# Patient Record
Sex: FEMALE | Race: WHITE | Employment: STUDENT | URBAN - METROPOLITAN AREA
[De-identification: names, ages, dates, MRNs, and addresses within clinical notes are randomized per-mention and may not be internally consistent; named-entity substitution may affect disease eponyms.]

---
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
## 2025-03-03 ENCOUNTER — OFFICE VISIT (OUTPATIENT)
Age: 20
End: 2025-03-03
Payer: MEDICAID

## 2025-03-03 ENCOUNTER — HOSPITAL ENCOUNTER (OUTPATIENT)
Dept: GENERAL RADIOLOGY | Facility: HOSPITAL | Age: 20
Discharge: HOME | End: 2025-03-03
Attending: PHYSICIAN ASSISTANT
Payer: MEDICAID

## 2025-03-03 DIAGNOSIS — M54.2 CERVICALGIA: ICD-10-CM

## 2025-03-03 DIAGNOSIS — M54.2 NECK PAIN: ICD-10-CM

## 2025-03-03 DIAGNOSIS — M54.2 NECK PAIN: Primary | ICD-10-CM

## 2025-03-03 DIAGNOSIS — M54.12 CERVICAL RADICULOPATHY: ICD-10-CM

## 2025-03-03 PROCEDURE — 72050 X-RAY EXAM NECK SPINE 4/5VWS: CPT | Performed by: PHYSICIAN ASSISTANT

## 2025-03-03 RX ORDER — METHOCARBAMOL 500 MG/1
500 TABLET, FILM COATED ORAL NIGHTLY PRN
Qty: 30 TABLET | Refills: 1 | Status: SHIPPED | OUTPATIENT
Start: 2025-03-03

## 2025-03-03 NOTE — PROGRESS NOTES
Patient: Suha Valles  Medical Record Number: [id number]  Site: [facility]  Referring Physician:  No ref. provider found  PCP: Moses Sinclair MD       HISTORY OF CHIEF COMPLAINT:    Suha Valles is a 19 year old female, who complains of 1 year h/o bilateral radiating neck pain.  Denies dropping objects or difficulty walking.  Most the patient's pain is right to the right and left side of her neck to her trapezius.  She occasionally gets pain down her bilateral upper arms.  Right side is worse than left side.  She occasionally has tingling in the same distribution as her pain.  She has no weakness.  She is not dropping objects.  She feels a heaviness upon her shoulders.  She has not had any recent treatment other than heat and anti-inflammatories.  She had a neck strain that was treated with muscle relaxers in 2021.    VAS Pain Score: 2 /10      History reviewed. No pertinent past medical history.   Past Surgical History:   Procedure Laterality Date    Tonsillectomy        Family History   Problem Relation Age of Onset    Other (Other) Paternal Grandfather         LUPUS      Social History     Socioeconomic History    Marital status: Single   Tobacco Use    Smoking status: Never    Smokeless tobacco: Never   Vaping Use    Vaping status: Never Used   Substance and Sexual Activity    Alcohol use: Never    Drug use: Never      Current Medications:  Current Outpatient Medications   Medication Sig Dispense Refill    methocarbamol 500 MG Oral Tab Take 1 tablet (500 mg total) by mouth nightly as needed. 30 tablet 1    fludrocortisone 0.1 MG Oral Tab         .        IMAGING STUDIES:  X-rays were reviewed with the patient today  X-rays  Images were reviewed and discussed with the patient.  They voiced understanding of what the images showed.  EXAM:  X-RAY OF CERVICAL SPINE     CLINICAL INFORMATION:  Neck pain    FINDINGS:    AP and Lateral views of cervical spine completed.   7 cervical vertebrae visualized.   Disc  heights well maintained.    Spinous processes well aligned.      No obvious fractures or destructive lesions seen.        IMPRESSION:    Unremarkable AP/Lateral views of cervical spine.          PHYSICAL EXAMIMATION   PHYSICAL EXAMINATION: Suha Valles is a 19 year old   female who is observed sitting comfortably in the exam room alert and oriented times three. RESPIRATORY RATE: 18 She looks consistent her stated age.    LMP 07/15/2024 (Approximate)   The patient is well developed, well nourished, thin body habitus, well muscled.       Inspection: No acute distress.   Patient displays non-antalgic gait, and is able to normal heel walk, normal toe walk.     Coordination: Well coordinated, Fluid gait    Cervical spine:  Patient is A&O X 3 and in no apparent distress.   Neck is soft and supple with no masses or lymphadenopathy palpated.  mildly tender to palpation over the para spinals and C7 spinous process  Skin intact with no erythema, edema or ecchymosis noted  pain with left and right rotation of neck  Spulrlings Maneuver is negative  Davis's Maneuver is negative bilaterally  Smooth pain free ROM to bilateral wrist, elbow; no shoulder pain  Sensation is intact to all dermatome patterns of bilateral upper extremities  Tinel's is negative to bilateral cubital and carpal tunnels. negative Phalen's    Strength testing:   Left   Right   5/5 Deltoid  5/5 Deltoid    5/5 Biceps  5/5 Biceps   5/5 Triceps  5/5 Triceps    5/5 Ext Rot  5/5 Ext Rot   5/5 Wrist Ext  5/5 Wrist Ext   5/5 Intrinsics  5/5 Intrinsics    DTR  Left   Right   2+Biceps  2+ Biceps   2+Triceps  2+Triceps   2+Brichoradialis  2+Brachioradialis    Bilateral Lower Extremitis are negative for clonus                                                                                       MEDICAL DECISION MAKING:   Impression: Cervical radiculopathy  Cervicalgia    Plan: We discussed the diagnosis and treatment options today, including rationale for surgical vs  non-surgical options.  The patient mainly has isolated neck pain to the trapezius.  She occasionally describes symptoms of radiculopathy.  We are going to have her start with physical therapy.  I gave her the names of physical therapist in [place] that are good for the cervical spine.  We reviewed her x-rays today.  I gave her Robaxin just to use at nighttime as needed for muscle spasms.  Side effects were reviewed and discussed with her.  We will see her back in 6 weeks.  Her concerns were addressed.  Restrictions and limitations were reviewed with the patient.  Concerns were addressed.  Questions were encouraged and answered.  Patient voiced understanding.  Images were reviewed and discussed with the patient.  They voiced understanding of what the images showed.      The patient indicates understanding of these issues and agrees to the plan.    Thank you very much.     Respectfully yours,    Sushil Fulton PA-C    This note was dictated using Dragon software. While it was briefly proofread prior to completion, some grammatical, spelling, and word choice errors due to dictation may still occur.

## 2025-03-05 ENCOUNTER — APPOINTMENT (OUTPATIENT)
Dept: PHYSICAL THERAPY | Age: 20
End: 2025-03-05
Attending: PHYSICIAN ASSISTANT
Payer: MEDICAID

## 2025-03-05 ENCOUNTER — OFFICE VISIT (OUTPATIENT)
Dept: PHYSICAL THERAPY | Age: 20
End: 2025-03-05
Attending: PHYSICIAN ASSISTANT
Payer: MEDICAID

## 2025-03-05 PROCEDURE — 97110 THERAPEUTIC EXERCISES: CPT | Performed by: PHYSICAL THERAPIST

## 2025-03-05 PROCEDURE — 97162 PT EVAL MOD COMPLEX 30 MIN: CPT | Performed by: PHYSICAL THERAPIST

## 2025-03-05 NOTE — PROGRESS NOTES
SPINE EVALUATION:     Diagnosis:   Cervical radiculopathy (M54.12)  Cervicalgia (M54.2) Patient:  Suha Valles        Referring Provider: Sushil Fulton  Today's Date   3/5/2025    Precautions:  None   Date of Evaluation: 03/05/25  Next MD visit: April 14  Date of Surgery: NA     PATIENT SUMMARY   Summary of chief complaints: pain looking up during the day. Rotating and looking down is good. Reports if stays looking down too long will get pain into arms mostly L.  History of current condition: States started around a year ago - started to notice pain at her neck and upper back.  States some pain into R upper arm to elbow , min to L upper arm. 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   Pain level: current 2 /10, at best 2 /10, at worst 8 /10  Description of symptoms: pain at the neck during day, upper back starts more at night.  Arm -if doing dishes, lifting, dressing.  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  Did stop due to the pain, not working at this time.   Leisure activities/Hobbies:     Prior level of function: Prior to starting work as CNA a year ago had no pain  Current limitations: reaching objects off shelf, looking up to get things, doing dishes- states arms get tired and will get some pain into L arm mostly.  States difficulty falling asleep due to pain.   Pt goals: Reduce pain and heaviness at neck  Past medical history was reviewed with Suha.  Significant findings include:  Patient reports she has POTS.  May get dizzy at times or flushed.   Imaging/Tests: x-rays - normal   Suha  has no past medical history on file.  She  has a past surgical history that includes tonsillectomy.    ASSESSMENT  Suha presents to physical therapy evaluation with primary c/o pain looking up during the day. Rotating and looking down is good.. The results of the objective tests and measures show poor  sitting/standing posture with frwd head/shoulders, cervical spine restriction of motion with tightness L > R scalenes, Upper trap, lev scap, impaired scapular strength bilaterally, signs and symptoms consistent with postural syndrome and thoracic outlet,  median and ulnar nerve restriction L to R UE and c/o pain. Functional deficits include but are not limited to reaching objects off shelf, looking up to get things, doing dishes- states. Signs and symptoms are consistent with diagnosis of Cervical radiculopathy (M54.12)  Cervicalgia (M54.2). Pt and PT discussed evaluation findings, pathology, POC and HEP.  Pt voiced understanding and performs HEP correctly without reported pain. Skilled Physical Therapy is medically necessary to address the above impairments and reach functional goals. Focus will be on postural correction, scapular strengthening/endurance, pec stretches, cervical mobility/stretches, nerve mobilization.    OBJECTIVE:    Musculoskeletal:  Observation/Posture: forward head posture; rounded shoulders; increased thoracic kyphosis      Palpation:  tenderness L > R UT area, tenderness L > R cervical paraspinals.     Special tests:   Posture correction- L UT discomfort -   Cervical motions - did not produce tingling into arms, produced, NW neck stiffness     ROM and Strength:  (* denotes performed with pain)     Cervical ROM MMT (-/5)     Flex 60       Ext 50 - pain at the neck      R L R L     Side bend 35- neck pain L 32, neck pain L         Rotation 60- pulling L 60 - pulling L > R       ,   Shoulder   ROM MMT (-/5)    R L R L     Flex 160 160 5/5 5/5     Ext 65 50 5/5 5/5     Abd (C5) 165 165 5/5 5/5     IR WNL WNL 5/5 5/5     ER WNL WNL 5/5 5/5     Low Trap n/a 3+ 4minus/5     Mid Trap n/a 4 minus 4 minus     SA n/a           Flexibility:  UE Flexibility R L     Upper Trap min restricted mod restricted     Levator Scap min restricted mod restricted     Pec Major mod restricted mod restricted      Cris min restricted mod restricted     Latissimus WNL WNL     Bicep           Neurological:  Sensation: grossly intact to light touch CAMILLE UE/LE               Today's Treatment and Response:   Pt education was provided on exam findings, treatment diagnosis, treatment plan, expectations, and prognosis.  Today's Treatment       3/5/2025   Spine Treatment   Therapeutic Exercise Minutes 10   Evaluation Minutes 40   Total Time Of Timed Procedures 10   Total Time Of Service-Based Procedures 40   Total Treatment Time 50   HEP Corrected posture with lumbar support  Seated scapular retractions  Seated cervical retractions        Patient was instructed in and issued a HEP for: Corrected posture with lumbar support  Seated scapular retractions  Seated cervical retractions    Charges:  PT EVAL: Moderate Complexity,    In agreement with evaluation findings and clinical rationale, this evaluation involved MODERATE COMPLEXITY decision making due to 1-2 personal factors/comorbidities, 3 or more body structures involved/activity limitations, and evolving symptoms as documented in the evaluation.                                                                         PLAN OF CARE:    Goals: (to be met in 12 visits)   1. Patient to be independent with ongoing HEP,   2. Patient to have improved scapular strength by 1/2 to 1 mm grade for improved posture and support of arm with motions   3. Patient to report no tingling into arms with daily activities.   4. Patient to be able to look up and reach into closet or overhead without difficulty,   5. Patient to be aware of posture when sitting without cues,   6. Patient to be able to carry moderate weight without pain.   7. Patient to report no problems falling asleep at night due to pain.      Frequency / Duration: Patient will be seen 1-2 x weekx/week or a total of 12  visits over a 90 day period. Treatment will include: Manual Therapy; Therapeutic Exercise; Home Exercise Program  instruction    Education or treatment limitation: None   Rehab Potential: good     Neck Disability Index Score  Score: (Patient-Rptd) 22 % (3/4/2025  6:24 PM)      Patient was advised of these findings, precautions, and treatment options and has agreed to actively participate in planning and for this course of care.    Thank you for your referral. Please co-sign or sign and return this letter via fax as soon as possible to [phone]. If you have any questions, please contact me at Dept: [phone]    Sincerely,  Electronically signed by therapist: Lliia Light PT  Physician's certification required: Yes  I certify the need for these services furnished under this plan of treatment and while under my care.    X___________________________________________________ Date____________________    Certification From: 3/5/2025  To:6/3/2025

## 2025-03-07 ENCOUNTER — APPOINTMENT (OUTPATIENT)
Dept: PHYSICAL THERAPY | Age: 20
End: 2025-03-07
Attending: PHYSICIAN ASSISTANT
Payer: MEDICAID

## 2025-03-07 ENCOUNTER — TELEPHONE (OUTPATIENT)
Dept: PHYSICAL THERAPY | Facility: HOSPITAL | Age: 20
End: 2025-03-07

## 2025-03-24 ENCOUNTER — APPOINTMENT (OUTPATIENT)
Dept: PHYSICAL THERAPY | Age: 20
End: 2025-03-24
Attending: PHYSICIAN ASSISTANT
Payer: MEDICAID

## 2025-03-25 ENCOUNTER — TELEPHONE (OUTPATIENT)
Dept: PHYSICAL THERAPY | Age: 20
End: 2025-03-25

## 2025-03-28 ENCOUNTER — APPOINTMENT (OUTPATIENT)
Dept: PHYSICAL THERAPY | Age: 20
End: 2025-03-28
Attending: PHYSICIAN ASSISTANT
Payer: MEDICAID

## 2025-04-01 ENCOUNTER — APPOINTMENT (OUTPATIENT)
Dept: PHYSICAL THERAPY | Age: 20
End: 2025-04-01
Attending: PHYSICIAN ASSISTANT
Payer: MEDICAID

## 2025-04-04 ENCOUNTER — APPOINTMENT (OUTPATIENT)
Dept: PHYSICAL THERAPY | Age: 20
End: 2025-04-04
Attending: PHYSICIAN ASSISTANT
Payer: MEDICAID

## 2025-04-07 ENCOUNTER — APPOINTMENT (OUTPATIENT)
Dept: PHYSICAL THERAPY | Age: 20
End: 2025-04-07
Attending: PHYSICIAN ASSISTANT
Payer: MEDICAID

## 2025-04-09 ENCOUNTER — APPOINTMENT (OUTPATIENT)
Dept: PHYSICAL THERAPY | Age: 20
End: 2025-04-09
Attending: PHYSICIAN ASSISTANT
Payer: MEDICAID

## (undated) NOTE — ED AVS SNAPSHOT
Brian Burt   MRN: [id number]    Department:  [facility] Emergency Department   Date of Visit:  5/24/2019           Disclosure     Insurance plans vary and the physician(s) referred by the ER may not be covered by your plan.  Please contact you CARE PHYSICIAN AT ONCE OR RETURN IMMEDIATELY TO THE EMERGENCY DEPARTMENT. If you have been prescribed any medication(s), please fill your prescription right away and begin taking the medication(s) as directed.   If you believe that any of the medications

## (undated) NOTE — ED AVS SNAPSHOT
Parent/Legal Guardian Access to the Online [employer] Record of a Patient 15to 16Years Old  Return completed form by Secure email to [place] HIM/Medical Records Department: [email or web address]     Requirements and Procedures   Under [facility] MyChart ID and password with another person, that person may be able to view my or my child’s health information, and health information about someone who has authorized me as a MyChart proxy.    ·  I agree that it is my responsibility to select a confident Sign-Up Form and I agree to its terms.        Authorization Form     Please enter Patient’s information below:   Name (last, first, middle initial) __________________________________________   Gender  Male  Female    Last 4 Digits of Social Security Number Parent/Legal Guardian Signature                                  For Patient (1517 years of age)  I agree to allow my parent/legal guardian, named above, online access to my medical information currently available and that may become available as a result

## (undated) NOTE — LETTER
Date & Time: 10/4/2022, 2:50 PM  Patient: Humberto Perez  Encounter Provider(s):    Aron Closs, APRN       To Whom It May Concern:    Humberto Perez was seen and treated in our department on 10/4/2022. She can return to school.     If you have any questions or concerns, please do not hesitate to call.        _____________________________  Physician/APC Signature

## (undated) NOTE — LETTER
Date & Time: 3/11/2024, 3:38 PM  Patient: Suha Valles  Encounter Provider(s):    Estrella Mack MD       To Whom It May Concern:    Suha Valles was seen and treated in our department on 3/11/2024. She should not return to work until 03/13/2024 .    If you have any questions or concerns, please do not hesitate to call.        _____________________________  Physician/APC Signature

## (undated) NOTE — LETTER
Date & Time: 3/15/2021, 9:11 AM  Patient: Brian Burt  Encounter Provider(s):    Sammi Park MD       To Whom It May Concern:    Brian Burt was seen and treated in our department on 3/15/2021. She should not return to school until 03/17/2021.

## (undated) NOTE — LETTER
Date & Time: 6/14/2024, 9:26 AM  Patient: Suha Valles  Encounter Provider(s):    Riana Roberts MD       To Whom It May Concern:    Suha Valles was seen and treated in our department on 6/14/2024.     If you have any questions or concerns, please do not hesitate to call.        _____________________________  Physician/APC Signature

## (undated) NOTE — ED AVS SNAPSHOT
Senora Denver   MRN: [id number]    Department:  [facility] Emergency Department   Date of Visit:  12/10/2017           Disclosure     Insurance plans vary and the physician(s) referred by the ER may not be covered by your plan.  Please contact yo CARE PHYSICIAN AT ONCE OR RETURN IMMEDIATELY TO THE EMERGENCY DEPARTMENT. If you have been prescribed any medication(s), please fill your prescription right away and begin taking the medication(s) as directed.   If you believe that any of the medications

## (undated) NOTE — ED AVS SNAPSHOT
Parent/Legal Guardian Access to the Online [employer] Record of a Patient 15to 16Years Old  Return completed form by Secure email to [place] HIM/Medical Records Department: [email or web address]     Requirements and Procedures   Under [facility] MyChart ID and password with another person, that person may be able to view my or my child’s health information, and health information about someone who has authorized me as a MyChart proxy.    ·  I agree that it is my responsibility to select a confident Sign-Up Form and I agree to its terms.        Authorization Form     Please enter Patient’s information below:   Name (last, first, middle initial) __________________________________________   Gender  Male  Female    Last 4 Digits of Social Security Number Parent/Legal Guardian Signature                                  For Patient (1517 years of age)  I agree to allow my parent/legal guardian, named above, online access to my medical information currently available and that may become available as a result

## (undated) NOTE — LETTER
Date & Time: 11/14/2023, 4:49 AM  Patient: Marca Schaumann  Encounter Provider(s):    Hayley Ag MD       To Whom It May Concern:    Marca Schaumann was seen and treated in our department on 11/14/2023. She can return to work 11/15/2023.     If you have any questions or concerns, please do not hesitate to call.        _____________________________  RN Signature

## (undated) NOTE — ED AVS SNAPSHOT
Parent/Legal Guardian Access to the Online [employer] Record of a Patient 15to 16Years Old  Return completed form by Secure email to [place] HIM/Medical Records Department: [email or web address]     Requirements and Procedures   Under [facility] MyChart ID and password with another person, that person may be able to view my or my child’s health information, and health information about someone who has authorized me as a MyChart proxy.    ·  I agree that it is my responsibility to select a confident Sign-Up Form and I agree to its terms.        Authorization Form     Please enter Patient’s information below:   Name (last, first, middle initial) __________________________________________   Gender  Male  Female    Last 4 Digits of Social Security Number Parent/Legal Guardian Signature                                  For Patient (1517 years of age)  I agree to allow my parent/legal guardian, named above, online access to my medical information currently available and that may become available as a result

## (undated) NOTE — LETTER
Date & Time: 10/18/2022, 10:12 AM  Patient: Niya Gaming  Encounter Provider(s):    Maci Arana MD       To Whom It May Concern:    Niya Gaming was seen and treated in our department on 10/17/2022. She can return to school on 10/20/2022.     If you have any questions or concerns, please do not hesitate to call.        _____________________________  Physician/APC Signature

## (undated) NOTE — ED AVS SNAPSHOT
Parent/Legal Guardian Access to the Online [employer] Record of a Patient 15to 16Years Old  Return completed form by Secure email to [place] HIM/Medical Records Department: [email or web address]     Requirements and Procedures   Under [facility] MyChart ID and password with another person, that person may be able to view my or my child’s health information, and health information about someone who has authorized me as a MyChart proxy.    ·  I agree that it is my responsibility to select a confident Sign-Up Form and I agree to its terms.        Authorization Form     Please enter Patient’s information below:   Name (last, first, middle initial) __________________________________________   Gender  Male  Female    Last 4 Digits of Social Security Number Parent/Legal Guardian Signature                                  For Patient (1517 years of age)  I agree to allow my parent/legal guardian, named above, online access to my medical information currently available and that may become available as a result

## (undated) NOTE — ED AVS SNAPSHOT
[facility] Emergency Department    [address]     [place] [address]    Phone:  [phone]    Fax:  [phone]           Fifi Nuha   MRN: [id number]    Department:  [facility] Emergency Department   Date of Visit:  5/10/20 and Class Registration line at [phone] or find a doctor online by visiting [email or web address].    IF THERE IS ANY CHANGE OR WORSENING OF YOUR CONDITION, CALL YOUR PRIMARY CARE PHYSICIAN AT ONCE OR RETURN IMMEDIATELY TO [address] [place] [address].     If

## (undated) NOTE — LETTER
Date & Time: 4/5/2022, 8:24 AM  Patient: Magda Carreon  Encounter Provider(s):    Maximino Ferrara MD       To Whom It May Concern:    Magda Carreon was seen and treated in our department on 4/5/2022. She should not return to school until 4/12/22.     If you have any questions or concerns, please do not hesitate to call.        _____________________________  Physician/APC Signature

## (undated) NOTE — LETTER
Date & Time: 3/15/2024, 11:30 PM  Patient: Suha Valles  Encounter Provider(s):    Pako Wooten MD       To Whom It May Concern:    Suha Valles was seen and treated in our department on 3/15/2024. She should not return to work until 03/17/2024 .    If you have any questions or concerns, please do not hesitate to call.        _____________________________  Physician/APC Signature

## (undated) NOTE — LETTER
Date & Time: 8/7/2024, 3:17 AM  Patient: Suha Valles  Encounter Provider(s):    Nohemi Sarmiento MD       To Whom It May Concern:    Suha Valles was seen and treated in our department on 8/7/2024. She should not return to work until 8/8/2024 .    If you have any questions or concerns, please do not hesitate to call.        _____________________________  Physician/APC Signature

## (undated) NOTE — LETTER
Date & Time: 12/15/2022, 6:45 PM  Patient: Nicolas Bradford  Encounter Provider(s):    ALAN Lebron       To Whom It May Concern:    Nicolas Bradford was seen and treated in our department on 12/15/2022. Please allow patient to use crutches and utilize school elevator.     If you have any questions or concerns, please do not hesitate to call.        _____________________________  Physician/APC Signature

## (undated) NOTE — ED AVS SNAPSHOT
Casi Musa   MRN: [id number]    Department:  [facility] Emergency Department   Date of Visit:  8/15/2017           Disclosure     Insurance plans vary and the physician(s) referred by the ER may not be covered by your plan.  Please contact you CARE PHYSICIAN AT ONCE OR RETURN IMMEDIATELY TO THE EMERGENCY DEPARTMENT. If you have been prescribed any medication(s), please fill your prescription right away and begin taking the medication(s) as directed.   If you believe that any of the medications

## (undated) NOTE — LETTER
Date & Time: 11/1/2021, 11:26 AM  Patient: Greta Menard  Encounter Provider(s):    Krissy Finley MD       To Whom It May Concern:    Greta Menard was seen and treated in our department on 11/1/2021.  She can return to school with these limitations: o

## (undated) NOTE — ED AVS SNAPSHOT
Parent/Legal Guardian Access to the Online [employer] Record of a Patient 15to 16Years Old  Return completed form by Secure email to [place] HIM/Medical Records Department: [email or web address]     Requirements and Procedures   Under [facility] MyChart ID and password with another person, that person may be able to view my or my child’s health information, and health information about someone who has authorized me as a MyChart proxy.    ·  I agree that it is my responsibility to select a confident Sign-Up Form and I agree to its terms.        Authorization Form     Please enter Patient’s information below:   Name (last, first, middle initial) __________________________________________   Gender  Male  Female    Last 4 Digits of Social Security Number Parent/Legal Guardian Signature                                  For Patient (1517 years of age)  I agree to allow my parent/legal guardian, named above, online access to my medical information currently available and that may become available as a result

## (undated) NOTE — ED AVS SNAPSHOT
[facility] Emergency Department    [address]     [place] [address]    Phone:  [phone]    Fax:  [phone]           Milton Armas   MRN: [id number]    Department:  [facility] Emergency Department   Date of Visit:  5/10/20 covered by your plan. Please contact your insurance company to determine coverage and benefits available for follow-up care and referrals.       If you have difficulty scheduling your follow-up appointment as directed, please call our  at [phone] If you believe that any of the medications or instructions on this list is different from what your Primary Care doctor has instructed you - please continue to take your medications as instructed by your Primary Care doctor until you can check with your do coverage. Patient [address] is a [employer] program that can help with your Affordable Care Act coverage, as well as all types of Medicaid plans.   To get signed up and covered, please call [phone] and ask to get set up for an insuran

## (undated) NOTE — ED AVS SNAPSHOT
Riki Aguilar   MRN: [id number]    Department:  [facility] Emergency Department   Date of Visit:  11/27/2019           Disclosure     Insurance plans vary and the physician(s) referred by the ER may not be covered by your plan.  Please contact yo CARE PHYSICIAN AT ONCE OR RETURN IMMEDIATELY TO THE EMERGENCY DEPARTMENT. If you have been prescribed any medication(s), please fill your prescription right away and begin taking the medication(s) as directed.   If you believe that any of the medications

## (undated) NOTE — LETTER
Date & Time: 1/18/2023, 3:11 AM  Patient: Letha England  Encounter Provider(s):    Dustin Read MD       To Whom It May Concern:    Letha England was seen and treated in our department on 1/17/2023. She can return to school 01/19/2023.     If you have any questions or concerns, please do not hesitate to call.        _____________________________  Physician/APC Signature

## (undated) NOTE — LETTER
Date & Time: 10/27/2021, 2:25 PM  Patient: Vernestine Presume          Dear Rickie Presume,          After numerous attempts, we have been unable to contact you via telephone.  If you have any questions or need additional information please contact the Emergency

## (undated) NOTE — LETTER
Date & Time: 1/18/2023, 3:13 AM  Patient: Armond Duran  Encounter Provider(s):    Jet Malone MD       To Whom It May Concern:    Armond Duran was seen and treated in our department on 1/17/2023. Her brother's presence was necessary for her care here today. Can return to work 01/19/2023.     If you have any questions or concerns, please do not hesitate to call.        _____________________________  Physician/APC Signature

## (undated) NOTE — LETTER
Date & Time: 7/16/2024, 8:52 AM  Patient: Suha Valles  Encounter Provider(s):    Estrella Mack MD       To Whom It May Concern:    Suha Valles was seen and treated in our department on 7/16/2024. She should not return to work until 7/18/2024 .    If you have any questions or concerns, please do not hesitate to call.        _____________________________  Physician/APC Signature

## (undated) NOTE — LETTER
Date & Time: 10/17/2022, 3:04 AM  Patient: Hunter Guevara  Encounter Provider(s):    Elizabeth Azul MD       To Whom It May Concern:    Hunter Guevara was seen and treated in our department on 10/16/2022. She should not return to work until 10/18/22.     If you have any questions or concerns, please do not hesitate to call.        _____________________________  Physician/APC Signature